# Patient Record
Sex: MALE | Race: OTHER | NOT HISPANIC OR LATINO | Employment: FULL TIME | URBAN - METROPOLITAN AREA
[De-identification: names, ages, dates, MRNs, and addresses within clinical notes are randomized per-mention and may not be internally consistent; named-entity substitution may affect disease eponyms.]

---

## 2022-11-16 ENCOUNTER — OFFICE VISIT (OUTPATIENT)
Dept: SLEEP MEDICINE | Facility: CLINIC | Age: 27
End: 2022-11-16
Payer: COMMERCIAL

## 2022-11-16 VITALS — WEIGHT: 167 LBS | HEART RATE: 72 BPM | DIASTOLIC BLOOD PRESSURE: 80 MMHG | SYSTOLIC BLOOD PRESSURE: 124 MMHG

## 2022-11-16 DIAGNOSIS — R35.1 NOCTURIA: ICD-10-CM

## 2022-11-16 DIAGNOSIS — R06.83 SNORING: Primary | ICD-10-CM

## 2022-11-16 DIAGNOSIS — G47.10 HYPERSOMNOLENCE: ICD-10-CM

## 2022-11-16 PROCEDURE — 99999 PR PBB SHADOW E&M-NEW PATIENT-LVL II: ICD-10-PCS | Mod: PBBFAC,,, | Performed by: INTERNAL MEDICINE

## 2022-11-16 PROCEDURE — 99204 PR OFFICE/OUTPT VISIT, NEW, LEVL IV, 45-59 MIN: ICD-10-PCS | Mod: S$GLB,,, | Performed by: INTERNAL MEDICINE

## 2022-11-16 PROCEDURE — 99999 PR PBB SHADOW E&M-NEW PATIENT-LVL II: CPT | Mod: PBBFAC,,, | Performed by: INTERNAL MEDICINE

## 2022-11-16 PROCEDURE — 99204 OFFICE O/P NEW MOD 45 MIN: CPT | Mod: S$GLB,,, | Performed by: INTERNAL MEDICINE

## 2022-11-16 NOTE — PROGRESS NOTES
Referred by Self, Aaareferral     NEW PATIENT VISIT    Orlando Ramos  is a pleasant 27 y.o. male  who presents for sleepiness.    SLEEP SCHEDULE   Environment    Bed Time 7:30-10PM   Sleep Latency Not long   Arousals    Nocturia 0-1   Back to sleep    Wake time 5-6 AM ( 10A)   Naps Can take naps on weekends   Work        Vitals:    11/16/22 1438   BP: 124/80   Pulse: 72   Weight: 75.8 kg (167 lb)     Physical Exam:    GEN:   Well-appearing  Psych:  Appropriate affect, demonstrates insight  SKIN:  No rash on the face or bridge of the nose      LABS:   No results found for: HGB, CO2    RECORDS REVIEWED PREVIOUSLY:    No prior sleep testing.    ASSESSMENT      PROBLEM DESCRIPTION/ Sx on Presentation  STATUS   screening ALBERT   + snoring, + snoring arousals, no witnessed apneas   Worse since getting shoulder surgery and laying on his back more    New   Daytime Sx   For several years. In the past he required 10.5 hrs of sleep when on retreat, still had sleepiness.  + sleepiness when inactive (brain gets foggy, needs naps to be alert)  denies H/H hallucinations, denies sleep paralysis.  Sleep attacks: occasionally  Naps are refreshing    ESS 15/24 on intake  New   Other issues:     PLAN     -recommend sleep testing   -will need PSG with MSLT if HST negatie  -discussed trial therapy if ALBERT present and the patient is  open to a trial of CPAP therapy    RTC          The patient was given open opportunity to ask questions and/or express concerns about treatment plan.   All questions/concerns were discussed.     Two patient identifiers used prior to evaluation.

## 2022-11-18 ENCOUNTER — TELEPHONE (OUTPATIENT)
Dept: SLEEP MEDICINE | Facility: OTHER | Age: 27
End: 2022-11-18
Payer: COMMERCIAL

## 2022-12-05 ENCOUNTER — TELEPHONE (OUTPATIENT)
Dept: SLEEP MEDICINE | Facility: OTHER | Age: 27
End: 2022-12-05
Payer: COMMERCIAL

## 2022-12-07 ENCOUNTER — TELEPHONE (OUTPATIENT)
Dept: SLEEP MEDICINE | Facility: OTHER | Age: 27
End: 2022-12-07
Payer: COMMERCIAL

## 2022-12-08 ENCOUNTER — HOSPITAL ENCOUNTER (OUTPATIENT)
Dept: SLEEP MEDICINE | Facility: OTHER | Age: 27
Discharge: HOME | End: 2022-12-08
Attending: INTERNAL MEDICINE
Payer: COMMERCIAL

## 2022-12-08 DIAGNOSIS — R06.83 SNORING: ICD-10-CM

## 2022-12-08 DIAGNOSIS — R35.1 NOCTURIA: ICD-10-CM

## 2022-12-08 DIAGNOSIS — G47.10 HYPERSOMNOLENCE: ICD-10-CM

## 2022-12-08 PROCEDURE — 95800 SLP STDY UNATTENDED: CPT

## 2022-12-09 PROBLEM — R06.83 SNORING: Status: ACTIVE | Noted: 2022-12-09

## 2022-12-12 PROCEDURE — 95806 PR SLEEP STUDY, UNATTENDED, SIMUL RECORD HR/O2 SAT/RESP FLOW/RESP EFFT: ICD-10-PCS | Mod: 26,,, | Performed by: INTERNAL MEDICINE

## 2022-12-12 PROCEDURE — 95806 SLEEP STUDY UNATT&RESP EFFT: CPT | Mod: 26,,, | Performed by: INTERNAL MEDICINE

## 2022-12-13 ENCOUNTER — PATIENT MESSAGE (OUTPATIENT)
Dept: SLEEP MEDICINE | Facility: CLINIC | Age: 27
End: 2022-12-13
Payer: COMMERCIAL

## 2023-04-03 ENCOUNTER — TELEPHONE (OUTPATIENT)
Dept: NEUROLOGY | Facility: CLINIC | Age: 28
End: 2023-04-03
Payer: COMMERCIAL

## 2023-04-03 NOTE — TELEPHONE ENCOUNTER
----- Message from Conrad Otoole sent at 4/3/2023 11:50 AM CDT -----   Name of Who is Calling:     What is the request in detail: patient request call back in reference to discuss sleep study results and next steps to take  patient has schedule virtual appointment  but  is a  if can schedule for different time / if appointment is needed Please contact to further discuss and advise      Can the clinic reply by [facility]:     What Number to Call Back if not in [facility]:  [phone]

## 2023-04-03 NOTE — TELEPHONE ENCOUNTER
Staff left patient a vm in regards to appointment scheduled informing him physician only does virtual appointment on Wednesday and he's currently booked unless a cancellation occurs

## 2023-04-12 ENCOUNTER — PATIENT MESSAGE (OUTPATIENT)
Dept: SLEEP MEDICINE | Facility: CLINIC | Age: 28
End: 2023-04-12

## 2023-04-12 ENCOUNTER — OFFICE VISIT (OUTPATIENT)
Dept: SLEEP MEDICINE | Facility: CLINIC | Age: 28
End: 2023-04-12
Payer: COMMERCIAL

## 2023-04-12 DIAGNOSIS — G47.33 OSA (OBSTRUCTIVE SLEEP APNEA): Primary | ICD-10-CM

## 2023-04-12 DIAGNOSIS — G47.10 HYPERSOMNOLENCE: ICD-10-CM

## 2023-04-12 PROCEDURE — 99214 OFFICE O/P EST MOD 30 MIN: CPT | Mod: 95,,, | Performed by: INTERNAL MEDICINE

## 2023-04-12 PROCEDURE — 99214 PR OFFICE/OUTPT VISIT, EST, LEVL IV, 30-39 MIN: ICD-10-PCS | Mod: 95,,, | Performed by: INTERNAL MEDICINE

## 2023-04-12 NOTE — PROGRESS NOTES
The patient location is: [place]  The chief complaint leading to consultation is: ALBERT, sleepiness    Visit type: audiovisual    15 minutes of total time spent on the encounter, which includes face to face time and non-face to face time preparing to see the patient (eg, review of tests), Obtaining and/or reviewing separately obtained history, Documenting clinical information in the electronic or other health record, Independently interpreting results (not separately reported) and communicating results to the patient/family/caregiver, or Care coordination (not separately reported).     Each patient to whom he or she provides medical services by telemedicine is:  (1) informed of the relationship between the physician and patient and the respective role of any other health care provider with respect to management of the patient; and (2) notified that he or she may decline to receive medical services by telemedicine and may withdraw from such care at any time.    ESTABLISHED PATIENT VISIT    Orlando Ramos  is a pleasant 28 y.o. male  who presented in 2022 for sleepiness.    Here today for to discuss HST results    PLAN last visit:   -recommend sleep testing   -will need PSG with MSLT if HST negatie  -discussed trial therapy if ALBERT present and the patient is  open to a trial of CPAP therapy      Since last visit:   HST 12.8.22: AHI 8, RDI 11, probably mild        SLEEP SCHEDULE   Environment    Bed Time 7:30-10PM   Sleep Latency Not long   Arousals    Nocturia 0-1   Back to sleep    Wake time 5-6 AM ( 10A)   Naps Can take naps on weekends   Work        There were no vitals filed for this visit.    Physical Exam:    GEN:   Well-appearing  Psych:  Appropriate affect, demonstrates insight  SKIN:  No rash on the face or bridge of the nose      LABS:   No results found for: HGB, CO2    RECORDS REVIEWED PREVIOUSLY:    No prior sleep testing.    ASSESSMENT      PROBLEM DESCRIPTION/ Sx on Presentation Interval HX STATUS   Mild  ALBERT   + snoring, + snoring arousals, no witnessed apneas   Worse since getting shoulder surgery and laying on his back more   Has not received machine persists   Daytime sleepiness   For several years. In the past he required 10.5 hrs of sleep when on retreat, still had sleepiness.  + sleepiness when inactive (brain gets foggy, needs naps to be alert)  denies H/H hallucinations, denies sleep paralysis.  Sleep attacks: occasionally  Naps are refreshing    ESS 15/24 on intake Still having sleepiness uncontrolled   Other issues:     PLAN     -trial of auto-CPAP  -If sleepiness persists despite treatment of ALBERT  will pursue PSG with MSLT out of concern for central hypersomnia in light of sleep attacks,prolonged TST, severe sleepiness (ESS>10) going on for more than 2 months despite adequate sleep time,   And no medications or medical conditions likely to cause sleepiness      RTC          The patient was given open opportunity to ask questions and/or express concerns about treatment plan.   All questions/concerns were discussed.     Two patient identifiers used prior to evaluation.

## 2023-06-08 ENCOUNTER — OFFICE VISIT (OUTPATIENT)
Dept: SLEEP MEDICINE | Facility: CLINIC | Age: 28
End: 2023-06-08
Payer: COMMERCIAL

## 2023-06-08 VITALS
HEART RATE: 53 BPM | SYSTOLIC BLOOD PRESSURE: 125 MMHG | BODY MASS INDEX: 22.9 KG/M2 | HEIGHT: 72 IN | WEIGHT: 169.06 LBS | DIASTOLIC BLOOD PRESSURE: 82 MMHG

## 2023-06-08 DIAGNOSIS — G47.10 HYPERSOMNOLENCE: ICD-10-CM

## 2023-06-08 DIAGNOSIS — G47.33 OSA (OBSTRUCTIVE SLEEP APNEA): Primary | ICD-10-CM

## 2023-06-08 PROCEDURE — 99214 OFFICE O/P EST MOD 30 MIN: CPT | Mod: S$GLB,,, | Performed by: PHYSICIAN ASSISTANT

## 2023-06-08 PROCEDURE — 99214 PR OFFICE/OUTPT VISIT, EST, LEVL IV, 30-39 MIN: ICD-10-PCS | Mod: S$GLB,,, | Performed by: PHYSICIAN ASSISTANT

## 2023-06-08 PROCEDURE — 99999 PR PBB SHADOW E&M-EST. PATIENT-LVL III: ICD-10-PCS | Mod: PBBFAC,,, | Performed by: PHYSICIAN ASSISTANT

## 2023-06-08 PROCEDURE — 99999 PR PBB SHADOW E&M-EST. PATIENT-LVL III: CPT | Mod: PBBFAC,,, | Performed by: PHYSICIAN ASSISTANT

## 2023-06-08 NOTE — PROGRESS NOTES
Referred by No ref. provider found     ESTABLISHED PATIENT VISIT  New to me. Previously followed by Dr Blum.    Orlando Ramos  is a pleasant 28 y.o. male  with PMH significant for ADHD, ALBERT.      Here today for: Compliance     PLAN last visit:   -trial of auto-CPAP  -If sleepiness persists despite treatment of ALBERT  will pursue PSG with MSLT out of concern for central hypersomnia in light of sleep attacks, prolonged TST, severe sleepiness (ESS>10) going on for more than 2 months despite adequate sleep time,   And no medications or medical conditions likely to cause sleepiness      Since last visit:   Using CPAP nightly except for 1 week vacation to [facility] with no access to electricity. States he has noticed benefit from using CPAP, but endorses overall still feeling extremely sleepy.      PAP history   Problems    Mask FFM   Pressure 5-12cwp   DME HME   Machine age AirSense 11 4/27/23   Download 6.7.23: 23/30 x 5hrs 53mins, 5-12cwp (5.4/7.2/8.1), leak (0/6/98.2), AHI 1.3           SLEEP SCHEDULE   Environment     Bed Time 7:30-10PM   Sleep Latency Not long   Arousals     Nocturia 0-1   Back to sleep     Wake time 5-6 AM ( 10A)   Naps Can take naps on weekends   Work         History reviewed. No pertinent past medical history.  Patient Active Problem List   Diagnosis    Snoring     No current outpatient medications on file.       Vitals:    06/08/23 0830   BP: 125/82   BP Location: Left arm   Patient Position: Sitting   BP Method: Small (Automatic)   Pulse: (!) 53   Weight: 76.7 kg (169 lb 1.5 oz)   Height: 6' (1.829 m)     Physical Exam:    GEN:   Well-appearing  Psych:  Appropriate affect, demonstrates insight  SKIN:  No rash on the face or bridge of the nose      LABS:   No results found for: HGB, CO2    RECORDS REVIEWED PREVIOUSLY:    HST 12/08/22 AHI 8, RDI 11, <90% x 1.5%    ASSESSMENT    [place] Sleepiness Scale:  Sitting and reading:   3  Watching TV:    2  Passenger in a car x 1 hr:  3  Sitting  quietly after lunch:  2  Lying down to rest in PM:  3  Sitting, inactive in public:  1  Sitting+ talking to someone:  0  Stopped in traffic:   0  Total    14/24    PROBLEM DESCRIPTION/ Sx on Presentation Interval Hx STATUS   Mild ALBERT   + snoring, + snoring arousals, no witnessed apneas   Worse since getting shoulder surgery and laying on back more Good usage, reports improvement with CPAP, AHI <2 controlled   Daytime Sx   + sleepiness when inactive (brain gets foggy, needs naps to be alert)  Denies H/H hallucinations, Denies sleep paralysis.  Sleep attacks: occasionally  Naps are refreshing    For several years. In the past he required 10.5 hrs of sleep when on retreat, still had sleepiness.    Denies drowsy driving    ESS 15/24 on intake (reviewed from 11/16/22) Reports minimal improvements in sleepiness, still can sleep over 10 hours, still napping when able (refreshing); ESS 14/24 today persists       PLAN     -using and benefiting from CPAP therapy  -continue CPAP 5-12cwp nightly  -CPAP supplies ordered  -recommend PSG with MSLT out of concern for central hypersomnia in light of sleep attacks, prolonged TST, severe sleepiness (ESS>10) going on for more than 2 months despite adequate sleep time, and no medications or medical conditions likely to cause sleepiness  -discussed ALBERT and CPAP with patient in detail, including possible complications of untreated ALBERT like heart attack/stroke  -advised on strict driving precautions; advised never to drive drowsy    Advised on plan of care. Answered all patient questions. Patient verbalized understanding and voiced agreement with plan of care.       RTC 3 months or as needed     The patient was given open opportunity to ask questions and/or express concerns about treatment plan.   All questions/concerns were discussed.     Two patient identifiers used prior to evaluation.

## 2023-06-29 ENCOUNTER — TELEPHONE (OUTPATIENT)
Dept: SLEEP MEDICINE | Facility: OTHER | Age: 28
End: 2023-06-29
Payer: COMMERCIAL

## 2023-07-28 ENCOUNTER — TELEPHONE (OUTPATIENT)
Dept: SLEEP MEDICINE | Facility: OTHER | Age: 28
End: 2023-07-28
Payer: COMMERCIAL

## 2024-01-18 ENCOUNTER — PATIENT MESSAGE (OUTPATIENT)
Dept: SLEEP MEDICINE | Facility: OTHER | Age: 29
End: 2024-01-18
Payer: COMMERCIAL

## 2024-04-04 ENCOUNTER — TELEPHONE (OUTPATIENT)
Dept: SLEEP MEDICINE | Facility: OTHER | Age: 29
End: 2024-04-04
Payer: COMMERCIAL

## 2024-04-09 ENCOUNTER — HOSPITAL ENCOUNTER (OUTPATIENT)
Dept: SLEEP MEDICINE | Facility: OTHER | Age: 29
Discharge: HOME | End: 2024-04-09
Payer: COMMERCIAL

## 2024-04-09 DIAGNOSIS — G47.10 HYPERSOMNOLENCE: ICD-10-CM

## 2024-04-09 DIAGNOSIS — R06.83 SNORING: Primary | ICD-10-CM

## 2024-04-09 DIAGNOSIS — G47.33 OSA (OBSTRUCTIVE SLEEP APNEA): ICD-10-CM

## 2024-04-09 PROCEDURE — 95805 MULTIPLE SLEEP LATENCY TEST: CPT | Mod: 26,,, | Performed by: INTERNAL MEDICINE

## 2024-04-09 PROCEDURE — 95811 POLYSOM 6/>YRS CPAP 4/> PARM: CPT

## 2024-04-09 PROCEDURE — 95805 MULTIPLE SLEEP LATENCY TEST: CPT

## 2024-04-10 PROBLEM — G47.10 HYPERSOMNOLENCE: Status: ACTIVE | Noted: 2024-04-10

## 2024-04-10 LAB
AMPHET+METHAMPHET UR QL: NEGATIVE
BARBITURATES UR QL SCN>200 NG/ML: NEGATIVE
BENZODIAZ UR QL SCN>200 NG/ML: NEGATIVE
BZE UR QL SCN: NEGATIVE
CANNABINOIDS UR QL SCN: NEGATIVE
CREAT UR-MCNC: 227.7 MG/DL (ref 23–375)
ETHANOL UR-MCNC: <10 MG/DL
METHADONE UR QL SCN>300 NG/ML: NEGATIVE
OPIATES UR QL SCN: NEGATIVE
PCP UR QL SCN>25 NG/ML: NEGATIVE
TOXICOLOGY INFORMATION: NORMAL

## 2024-04-10 PROCEDURE — 80307 DRUG TEST PRSMV CHEM ANLYZR: CPT | Performed by: INTERNAL MEDICINE

## 2024-04-10 NOTE — PROGRESS NOTES
Orlando Ramos to [facility] on 04/10/24 for an MSLT study.     All 5 naps completed.  Pt wore m FFM on 8cm/H20

## 2024-04-10 NOTE — PROGRESS NOTES
Curtis Ramos to [facility] on 04/09/2024 for an overnight sleep study . Pt education,setup and cpap explanation given prior to testing. Disposable leads and sensors used where applicable.   Titration w/ pt's own Medium AirFit F40. MSLT to follow.  AM report to TALI BADILLO

## 2024-04-12 NOTE — PROCEDURES
[facility]    Titration Interpretation Report    Patient Name:  Orlando Ramos  MRN#:  [id number]  :  1995  Study Date:  2024  Referring Provider:  RIMA KINGSTON    The patient is a 29 year old Male who is 6' and weighs 169.0 lbs.  His BMI equals 23.1.  A full night PAP titration was performed.    Polysomnogram Data  A full night polysomnogram recorded the standard physiologic parameters including EEG, EOG, EMG, EKG, nasal and oral airflow.  Respiratory parameters of chest and abdominal movements were recorded with (RIP) Respiratory Inductance Plethsmography.  Oxygen saturation was recorded by pulse oximetry.    Titration Summary  The patient was titrated at pressures ranging from 7* cm/H20 with supplemental oxygen at - up to 9* cm/H20 with supplemental oxygen at -.  The last pressure used in the study was 8* cm/H20 with supplemental oxygen at -.    Sleep Architecture  The total recording time of the polysomnogram was 518.2 minutes.  The total sleep time was 412.5 minutes.  The patient spent 12.0% of total sleep time in Stage N1, 56.4% in Stage N2, 5.0% in Stages N3, and 26.7% in REM.  Sleep latency was 15.9 minutes.  REM latency was 88.0 minutes.  Sleep Efficiency was 79.6%.  Total wake time was 106.0 minutes for a total wake percentage of 17.7%.  Wake after Sleep Onset was 90.0 minutes.    Respiratory Summary  The polysomnogram revealed a presence of 1 obstructive, - central, and - mixed apneas resulting in Total Apnea index of 0.1 events per hour.  There were 25 hypopneas resulting in Total Hypopnea index of 3.6 events per hour.  The combined Apnea/Hypopnea index was 3.8 events per hour.  There were a total of - RERA events resulting in a Respiratory Disturbance Index (RDI) of 3.8 events per hour.     Mean oxygen saturation was 96.6%.  The lowest oxygen saturation during sleep was 93.0%.  Time spent ?88% oxygen saturation was - minutes (-).    Limb Movement Activity  There were  15 limb movements recorded.  Of this total, 15 were classified as PLMs.  Of the PLMs, 1 were associated with arousals.  The Limb Movement index was 2.2 per hour while the PLM index was 2.2 per hour and PLM with arousals index was 0.1 per hour.    Cardiac: single lead EKG revealed normal sinus rhythm     PAP titration:    Mask used in study: pt's own Medium AirFit F40  with no significant leaks   CPAP = 8 cwp was largely effective in lateral position in stage N2 sleep and stage REM sleep  CPAP = 9 cwp was largely effective in supine position in stage N2 sleep    To prevent sleep onset obstructive events, a pressure of at least 9 cwp was required.     Oxygenation:  Hypoxemia was only observed in relation to obstructive events.    Impression:  -obstructive sleep apnea      Recommendations:  -initial auto-CPAP settings CPAP min = 9 cwp  and CPAP max =  12 cwp  -if the patient complains of sleep disruption, CPAP min should be adjusted to 10cwp or higher depending on pressure tolerance  -ramp = 8 cwp or higher to achieve sleep onset  -see results of MSLT performed on the following day.   -the patient has follow up with Sleep Medicine        Anuj Blum MD    (This Sleep Study was interpreted by a Board Certified Sleep Specialist who conducted an epoch-by-epoch review of the entire raw data recording.)  (The indication for this sleep study was reviewed and deemed appropriate by AASM Practice Parameters or other reasons by a Board Certified Sleep Specialist.)    [facility]    Titration Report    Patient Name: Orlando Ramos Study Date: 4/9/2024   YOB: 1995 MRN #: [id number]   Age: 29 year SRINIVAS #: [id number]   Sex: Male Referring Provider: RIMA KINGSTON   Height: 6' Recording Tech: Carine Fournet RRT RPSGT   Weight: 169.0 lbs Scoring Tech: Supa Vasquesnel RRT RPSGT   BMI: 23.1 Interpreting Physician: -   ESS: - Neck Circumference: -     Study Overview    Lights Off: 09:22:37 PM  Count  Index   Lights On: 06:00:49 AM Awakenings: 27 3.9   Time in Bed: 518.2 min. Arousals: 64 9.3   Total Sleep Time: 412.5 min. Apneas & Hypopneas: 26 3.8    Sleep Efficiency: 79.6% Limb Movements: 15 2.2   Sleep Latency: 15.9 min. Snores: - -   Wake After Sleep Onset: 90.0 min. Desaturations: 14 2.0    REM Latency from Sleep Onset: 88.0 min. Minimum SpO2 TST: 93.0%      Sleep Architecture   % of Time in Bed  Stages Time (mins) % Sleep Time   Wake 106.0    Stage N1 49.5 12.0%   Stage N2 232.5 56.4%   Stage N3 20.5 5.0%   .0 26.7%         Arousal Summary     NREM REM Sleep Index   Respiratory Arousals 9 - 9 1.3   PLM Arousals 1 - 1 0.1   Isolated Limb Movement Arousals - - - -   Spontaneous Arousals 35 19 54 7.9   Total 45 19 64 9.3       Limb Movement Summary     Count Index   Isolated Limb Movements - -   Periodic Limb Movements (PLMs) 15 2.2   Total Limb Movements 15 2.2   Respiratory Summary     By Sleep Stage By Body Position Total    NREM REM Supine Non-Supine    Time (min) 302.5 110.0 138.0 274.5 412.5           Obstructive Apnea 1 - - 1 1   Mixed Apnea - - - - -   Central Apnea - - - - -   Central Apnea Index - - - - -   Total Apneas 1 - - 1 1   Total Apnea Index 0.2 - - 0.2 0.1           Total Hypopnea 22 3 5 20 25   Total Hypopnea Index 4.4 1.6 2.2 4.4 3.6           Apnea & Hypopnea 23 3 5 21 26   Apnea & Hypopnea Index 4.6 1.6 2.2 4.6 3.8           RERAs - - - - -   RERA Index - - - - -           RDI 4.6 1.6 2.2 4.6 3.8     Scoring Criteria: Hypopneas scored at 3% desaturation criteria.    Respiratory Event Durations     Apnea Hypopnea    NREM REM NREM REM   Average (seconds) 11.8 - 21.0 22.4   Maximum (seconds) 11.8 - 42.4 26.1       Oxygen Saturation Summary     Wake NREM REM TST Total   Average SpO2 97.0% 96.2% 97.0% 96.4% 96.6%   Minimum SpO2 94.0% 93.0% 95.0% 93.0% 93.0%   Maximum SpO2 99.0% 98.0% 98.0% 98.0% 99.0%     Oxygen Saturation Distribution    Range (%) Time in range (min) Time in range  (%)    90.0 - 100.0 513.0 99.7%   80.0 - 90.0 - -   70.0 - 80.0 - -   60.0 - 70.0 - -   50.0 - 60.0 - -   0.0 - 50.0 - -   Time Spent ?88% SpO2    Range (%) Time in range (min) Time in range (%)   0.0 - 88.0 - -          Count Index   Desaturations 14 2.0      Cardiac Summary     Wake NREM REM Sleep Total   Average Pulse Rate (BPM) 56.2 52.5 54.5 53.1 53.7   Minimum Pulse Rate (BPM) 25.0 43.0 44.0 43.0 25.0   Maximum Pulse Rate (BPM) 85.0 82.0 87.0 87.0 87.0     Pulse Rate Distribution    Range (bpm) Time in range (min) Time in range (%)   0.0 - 40.0 0.0 0.0%   40.0 - 60.0 471.6 91.6%   60.0 - 80.0 42.0 8.2%   80.0 - 100.0 1.1 0.2%   100.0 - 120.0 - -   120.0 - 140.0 - -   140.0 - 200.0 - -     EtCO2 Summary    Stage Min (mmHg) Average (mmHg) Max (mmHg)   Wake - - -   NREM(1+2+3) - - -   REM - - -     Range (mmHg) Time in range (min) Time in range (%)   20.0 - 40.0 - -   40.0 - 50.0 - -   50.0 - 55.0 - -   55.0 - 100.0 - -   Excluded data <20.0 & >65.0 518.5 100.0%     TcCO2 Summary    Stage Min (mmHg) Average (mmHg) Max (mmHg)   Wake - - -   NREM(1+2+3) - - -   REM - - -     Range (mmHg) Time in range (min) Time in range (%)   20.0 - 40.0 - -   40.0 - 50.0 - -   50.0 - 55.0 - -   55.0 - 100.0 - -   Excluded data <20.0 & >65.0 518.5 100.0%     Comments    -    Titration Summary    PAP Device PAP Level O2 Level Time (min) TST (min) NREM (min) REM (min) Wake (min) Sleep Eff% OA# CA# MA# Hyp# AHI RERA RDI Min SpO2 SpO2 ?88% (min) Ar. Index   CPAP 7 - 42.5 19.5 19.5 0.0 23.0 45.9% - - - 5 15.4 - 15.4 93.0  0.0 30.8   CPAP 8 - 235.5 223.0 161.0 62.0 12.5 94.7% - - - 12 3.2 - 3.2 94.0  0.0 8.1   CPAP 9 - 240.5 170.0 122.0 48.0 70.5 70.7% 1 - - 8 3.2 - 3.2 94.0  0.0 8.5

## 2024-04-13 NOTE — PROCEDURES
[facility]  Tel: [phone]    MULTIPLE SLEEP LATENCY TEST (MSLT) REPORT     [facility]    MSLT Report    Patient Name: MITCH RALPH Study Date: 4/10/2024   YOB: 1995 MRN #: [id number]   Age: 29 year SRINIVAS #: [id number]   Sex: Male Referring Provider: RIMA ROTH PA-C   Height: 6' Recording Tech: Asif Keya RPSGT   Weight: 169.0 lbs Scoring Tech: Supa Child RRT RPSGT   BMI: 23.1 Interpreting Physician: Anuj Blum MD   ESS: - Neck Circumference: -       Nap Summary     Nap 1 Nap 2 Nap 3 Nap 4 Nap 5 Average   Lights Off 07:49:06 AM 09:42:35 AM 11:37:19 AM 01:24:19 PM 03:15:07 PM -   Lights On 08:08:26 AM 10:07:27 AM 11:59:58 AM 01:49:28 PM 03:36:57 PM -   Time In Bed 19.3 24.9 22.6 25.1 21.8 22.8   Sleep Time 14.5 16.0 15.5 12.0 9.5 13.5   Sleep Latency 4.3 8.4 4.6 10.1 4.8 6.5   REM Sleep Onset Latency - - - - - -   * Time formats are in min:sec. Note: report will return default time = 20 min. for Sleep Latency, if no sleep occurs during nap.                Observations:  This test follows an overnight PSG which showed 412 minutes of sleep.  The PSG was performed on CPAP =9 cwp with a residual AHI= 3.2.  Urine drug screen performed on the day of this test and is negative for drugs of abuse  Sleep was seen on 5/ 5 naps.  Mean sleep latency of 6 m 30s is  consistent with pathologic sleepiness.  Sleep-onset REM periods (SOREMP) were seen on 0/5 attempts    Impression:    Hypersomnolence (G47.10)    Recommendations:    -the patient will be followed up to discuss study results and treatment options  -the patient has follow up with Sleep Medicine        Anuj Blum MD    (This Sleep Study was interpreted by a Board Certified Sleep Specialist who conducted an epoch-by-epoch review of the entire raw data recording.)  (The indication for this sleep study was reviewed and deemed appropriate by AASM Practice Parameters or other reasons by a Board  Certified Sleep Specialist.)

## 2024-04-25 ENCOUNTER — PATIENT MESSAGE (OUTPATIENT)
Dept: SLEEP MEDICINE | Facility: CLINIC | Age: 29
End: 2024-04-25
Payer: COMMERCIAL

## 2024-04-26 ENCOUNTER — PATIENT MESSAGE (OUTPATIENT)
Dept: SLEEP MEDICINE | Facility: CLINIC | Age: 29
End: 2024-04-26
Payer: COMMERCIAL

## 2024-05-04 NOTE — PROGRESS NOTES
"  ESTABLISHED PATIENT VISIT      Orlando Ramos  is a pleasant 29 y.o. male  with PMH significant for ADHD, ALBERT.      Here today for: Compliance     Since last visit:   See assessment below    No past medical history on file.  Patient Active Problem List   Diagnosis    Snoring    Hypersomnolence     No current outpatient medications on file.       Vitals:    05/06/24 0933   BP: 115/63   BP Location: Left arm   Patient Position: Sitting   Pulse: (!) 58   Weight: 83.8 kg (184 lb 13.7 oz)   Height: 6' (1.829 m)       Physical Exam:    GEN:   Well-appearing  Psych:  Appropriate affect, demonstrates insight  SKIN:  No rash on the face or bridge of the nose      LABS:   No results found for: "HGB", "CO2"    RECORDS REVIEWED PREVIOUSLY:    HST 12/08/22 AHI 8, RDI 11, <90% x 1.5%    6.7.23: 23/30 x 5hrs 53mins, 5-12cwp (5.4/7.2/8.1), leak (0/6/98.2), AHI 1.3        PROBLEM DESCRIPTION/ Sx on Presentation Interval Hx STATUS PLAN   Mild ALBERT   + snoring, + snoring arousals, no witnessed apneas   Worse since getting shoulder surgery and laying on back more      PAP history   Problems    Mask FFM   Pressure 5-12cwp   DME HME   Machine age AirSense 11 4/27/23   Download         Good usage, reports improvement with CPAP, AHI <2      PAP 4.9.24:   Mask used in study: pt's own Medium AirFit F40  with no significant leaks   CPAP = 8 cwp was largely effective in lateral position in stage N2 sleep and stage REM sleep  CPAP = 9 cwp was largely effective in supine position in stage N2 sleep    Rx: Jack 9 , PS 0, max ? Push min to: 10 Ramp: off due to sleep onset events   minutes, 9cwp resid ahi 3.2    UDS 4.10.24: UDS negative  MSLT 4/9/24: MSL 6.5, SOREMPS 0/5, UDS negative    4.26.24: MD: adjust min to 9cwp?, suggested visit to discuss options    4.29.24: pt sure!, I adjusted 9-12, no ramp, -> ok to use 3 day hold    4.22.24: 22/35 x 4h 48min, 5-12 (5.4/7/8.6), Leak 1/17/72, AHI 1.2    5.4.24: 3/6 9-12 (9.1/10/10.7), leak " 0/4/61, AHI 1.7       controlled     PAP PLAN   EPAP min decrease to 8cwp   IPAP max   Decrease to 8 cwp due to aerophagia   PS    RAMP    EPR 3 ->2   Mask    Other    Altn.          -hopefully can increase time with aerophagia improved    The patient is using and benefitting from PAP therapy.     Idiopathi hypersomnia         SLEEPINESS   Duration several   Testing    Reported TST Up to 10.5 hrs on retreat, still naps   Maximal sleep time    H/H hallucinations None     sleep paralysis    sleep attacks occasional   sleep inertia    Naps    Cataplexy    ESS on intake ESS 15/24 on intake    Prior meds For adhd:  Adderall 50-60mg qd (mood side effects)   Current meds          SLEEP SCHEDULE   Environment     Bed Time 7:30-10PM   Sleep Latency Not long   Arousals     Nocturia 0-1   Back to sleep     Wake time 5-6 AM ( 10A)   Naps Can take naps on weekends   Work                      Still sleepy despite nights of 6+ hours of CPAP usage     persists       -will start with modafinil 200mg prn sleepiness      Also discussed:    -trial of solriamfetol     -trial of wakix    -trial of stimulants ( consider another trial of adderall IR at a lower dose or other stimulant)l     -GHB tx if not responding to the above and ALBERT is very well-controlled               5/6/2024 Kulwant    RTC:  in approximately 6 months

## 2024-05-06 ENCOUNTER — OFFICE VISIT (OUTPATIENT)
Dept: SLEEP MEDICINE | Facility: CLINIC | Age: 29
End: 2024-05-06
Payer: COMMERCIAL

## 2024-05-06 VITALS
WEIGHT: 184.88 LBS | HEIGHT: 72 IN | HEART RATE: 58 BPM | SYSTOLIC BLOOD PRESSURE: 115 MMHG | DIASTOLIC BLOOD PRESSURE: 63 MMHG | BODY MASS INDEX: 25.04 KG/M2

## 2024-05-06 DIAGNOSIS — R40.0 SOMNOLENCE: Primary | ICD-10-CM

## 2024-05-06 DIAGNOSIS — G47.33 OSA (OBSTRUCTIVE SLEEP APNEA): ICD-10-CM

## 2024-05-06 PROCEDURE — 99214 OFFICE O/P EST MOD 30 MIN: CPT | Mod: S$GLB,,, | Performed by: INTERNAL MEDICINE

## 2024-05-06 PROCEDURE — 99999 PR PBB SHADOW E&M-EST. PATIENT-LVL III: CPT | Mod: PBBFAC,,, | Performed by: INTERNAL MEDICINE

## 2024-05-06 RX ORDER — MODAFINIL 200 MG/1
200 TABLET ORAL DAILY PRN
Qty: 30 TABLET | Refills: 2 | Status: SHIPPED | OUTPATIENT
Start: 2024-05-06